# Patient Record
(demographics unavailable — no encounter records)

---

## 2024-10-24 NOTE — ASSESSMENT
[FreeTextEntry1] : #folliculitis #fibroma -likely started with ingrown hair and folliculitis of scrotum, imp s/p abx. fibrous papule at site of picking. no e/o foreign body, no features of condyloma. most c/w with little fibroma/LSC  -gentle care advised -clinda lotion BID -HC oint bid prn itch/inflammation -trial of cryo discussed for fibroma. pt would like to try. verbal consent. LN2 x 2 cycles to 1 lseion on the scrotum today. tolerated well wo event

## 2024-10-24 NOTE — PHYSICAL EXAM
[FreeTextEntry3] :  Gen:                        Well appearing, well nourished, NAD. Skin:                       Eccrine:                 Within normal limits   Groin:                     Genital:                               Neuro:                     No focal deficits. Age appropriate. Psych:                     Appropriate affect.

## 2024-10-24 NOTE — HISTORY OF PRESENT ILLNESS
[FreeTextEntry1] : "splinter" on the scrotum, folliculitis [de-identified] : pt c/f bumps and potneital splinter embeded in the scrotum which started following a hair cut previuosly was noting pus bumps, went to urgent care, received abx with improvement pt here for continued milder inflammation as well as solitary non resolving bump

## 2024-11-07 NOTE — HISTORY OF PRESENT ILLNESS
[FreeTextEntry1] : 27 year old M with HLD (LDL 230s in 2022) who presents for evaluation of chest tightness.  Pt reports 1 year of on-and-off mid-sternal chest pain lasting several seconds, requiring him to take deep breaths (?SOB), which slowly improve his symptoms. It is not related to exertion but sometimes occurs after exercise. He also reports occasional mid-sternal CP while laying down and changing positions in bed. He denies palpitation, dizziness, LOC, orthopnea, PND, or LE edema. He has FHx of HLD in his father's side and his LDL was 230s in 2022. He was started on atorvastatin 10mg daily which controlled his cholesterol so briefly stopped but resumed again given elevated LDL.

## 2024-11-14 NOTE — ASSESSMENT
[FreeTextEntry1] : #folliculitis -scrotum - resolved -gentle care measures disc robert relating to hair removal practices. can restart clinda prn if folliculitis recurs. small fibroma vs early prurigo resolved s/p LOV (tx with cryo)  #seborrheic keratosis, irritated and inflamed -new lesion on L arm with benign dermoscopy -curettage and cryo #1 today  -verbal consent. curettage followed by LN2 x 2 cycles to 1 lseion on the L arm today. tolerated well wo event. aftercare disc.

## 2024-11-14 NOTE — HISTORY OF PRESENT ILLNESS
[FreeTextEntry1] : fibroma, folliculitis, irritated SK L arm [de-identified] : scrotal folliculitis resolved since LOV fibrous papule resolved as well - ddx fibroma, early prurigo new lesion of concern today L arm